# Patient Record
Sex: MALE | Race: WHITE | Employment: OTHER | URBAN - METROPOLITAN AREA
[De-identification: names, ages, dates, MRNs, and addresses within clinical notes are randomized per-mention and may not be internally consistent; named-entity substitution may affect disease eponyms.]

---

## 2017-10-18 ENCOUNTER — HOSPITAL ENCOUNTER (OUTPATIENT)
Dept: GENERAL RADIOLOGY | Age: 67
Discharge: HOME | End: 2017-10-18
Payer: MEDICARE

## 2017-10-18 DIAGNOSIS — R10.9 STOMACH ACHE: ICD-10-CM

## 2017-10-18 PROCEDURE — 74000 XR ABD (KUB): CPT

## 2017-12-01 ENCOUNTER — HOSPITAL ENCOUNTER (OUTPATIENT)
Dept: CT IMAGING | Age: 67
Discharge: HOME | End: 2017-12-01
Attending: ORTHOPAEDIC SURGERY
Payer: MEDICARE

## 2017-12-01 ENCOUNTER — HOSPITAL ENCOUNTER (OUTPATIENT)
Dept: MRI IMAGING | Age: 67
Discharge: HOME | End: 2017-12-01
Attending: ORTHOPAEDIC SURGERY
Payer: MEDICARE

## 2017-12-01 DIAGNOSIS — M54.50 LOW BACK PAIN: ICD-10-CM

## 2017-12-01 DIAGNOSIS — M48.061 LUMBAR SPINAL STENOSIS: ICD-10-CM

## 2017-12-01 DIAGNOSIS — Z98.1 S/P LUMBAR SPINAL FUSION: ICD-10-CM

## 2017-12-01 DIAGNOSIS — M51.36 DDD (DEGENERATIVE DISC DISEASE), LUMBAR: ICD-10-CM

## 2017-12-01 DIAGNOSIS — M54.30 SCIATICA: ICD-10-CM

## 2017-12-01 PROCEDURE — 72131 CT LUMBAR SPINE W/O DYE: CPT

## 2017-12-01 PROCEDURE — 72148 MRI LUMBAR SPINE W/O DYE: CPT

## 2017-12-19 ENCOUNTER — HOSPITAL ENCOUNTER (OUTPATIENT)
Dept: INTERVENTIONAL RADIOLOGY/VASCULAR | Age: 67
Discharge: HOME | End: 2017-12-19
Attending: ORTHOPAEDIC SURGERY | Admitting: RADIOLOGY
Payer: MEDICARE

## 2017-12-19 VITALS
TEMPERATURE: 98.1 F | WEIGHT: 222 LBS | SYSTOLIC BLOOD PRESSURE: 141 MMHG | DIASTOLIC BLOOD PRESSURE: 85 MMHG | RESPIRATION RATE: 20 BRPM | HEART RATE: 94 BPM | BODY MASS INDEX: 32.88 KG/M2 | OXYGEN SATURATION: 98 % | HEIGHT: 69 IN

## 2017-12-19 DIAGNOSIS — M48.061 LUMBAR SPINAL STENOSIS: ICD-10-CM

## 2017-12-19 PROCEDURE — 64483 NJX AA&/STRD TFRM EPI L/S 1: CPT

## 2017-12-19 PROCEDURE — 74011250636 HC RX REV CODE- 250/636: Performed by: RADIOLOGY

## 2017-12-19 PROCEDURE — 74011636320 HC RX REV CODE- 636/320: Performed by: RADIOLOGY

## 2017-12-19 PROCEDURE — 74011000250 HC RX REV CODE- 250: Performed by: RADIOLOGY

## 2017-12-19 RX ORDER — LIDOCAINE HYDROCHLORIDE 10 MG/ML
10 INJECTION, SOLUTION EPIDURAL; INFILTRATION; INTRACAUDAL; PERINEURAL
Status: COMPLETED | OUTPATIENT
Start: 2017-12-19 | End: 2017-12-19

## 2017-12-19 RX ORDER — SODIUM BICARBONATE 42 MG/ML
5 INJECTION, SOLUTION INTRAVENOUS
Status: COMPLETED | OUTPATIENT
Start: 2017-12-19 | End: 2017-12-19

## 2017-12-19 RX ORDER — METHYLPREDNISOLONE ACETATE 40 MG/ML
80 INJECTION, SUSPENSION INTRA-ARTICULAR; INTRALESIONAL; INTRAMUSCULAR; SOFT TISSUE
Status: COMPLETED | OUTPATIENT
Start: 2017-12-19 | End: 2017-12-19

## 2017-12-19 RX ORDER — CYCLOBENZAPRINE HCL 5 MG
5 TABLET ORAL 2 TIMES DAILY
COMMUNITY

## 2017-12-19 RX ADMIN — LIDOCAINE HYDROCHLORIDE 10 ML: 10 INJECTION, SOLUTION EPIDURAL; INFILTRATION; INTRACAUDAL; PERINEURAL at 08:12

## 2017-12-19 RX ADMIN — IOHEXOL 20 ML: 180 INJECTION INTRAVENOUS at 08:11

## 2017-12-19 RX ADMIN — METHYLPREDNISOLONE ACETATE 80 MG: 40 INJECTION, SUSPENSION INTRA-ARTICULAR; INTRALESIONAL; INTRAMUSCULAR; SOFT TISSUE at 08:12

## 2017-12-19 RX ADMIN — SODIUM BICARBONATE 2 ML: 42 INJECTION, SOLUTION INTRAVENOUS at 08:11

## 2017-12-19 NOTE — PROCEDURES
PROCEDURE:TFESI. INDICATION:LLE pain. ANESTHESIA:local.  COMPLICATION:NONE. SPECIMENS REMOVED:none. BLOOD LOSS:NONE. /ASSISTANT:TRIXIE Sr RECOMMENDATIONS:none. CONSENT OBTAINED:YES.  NOTES:none.

## 2017-12-19 NOTE — ROUTINE PROCESS
Discharged via wheel chair to wife. Post procedure discomfort has subsided. Accepted ice chips. Able to stand and step without difficulty. No complaints voiced.

## 2017-12-19 NOTE — IP AVS SNAPSHOT
[address] [facility] [place] [address] 
[phone] Patient: Benja Emerson MRN: [id number] WOM:[birth year] About your hospitalization You were admitted on:  December 19, 2017 You last received care in the:  [facility] RAD ANGIO IR You were discharged on:  December 19, 2017 Why you were hospitalized Your primary diagnosis was:  Not on File Discharge Orders None A check juana indicates which time of day the medication should be taken. My Medications ASK your physician about these medications Instructions Each Dose to Equal  
 Morning Noon Evening Bedtime  
 aspirin 81 mg chewable tablet Your last dose was: Your next dose is: Take 243 mg by mouth daily. 243 mg  
    
   
   
   
  
 cyclobenzaprine 5 mg tablet Commonly known as:  FLEXERIL Your last dose was: Your next dose is: Take 5 mg by mouth two (2) times a day. 5 mg  
    
   
   
   
  
 oxyCODONE IR 5 mg immediate release tablet Commonly known as:  Adamant Pollack Your last dose was: Your next dose is: Take 1-2 Tabs by mouth every three (3) hours as needed. Max Daily Amount: 80 mg.  
 5-10 mg  
    
   
   
   
  
 pantoprazole 40 mg tablet Commonly known as:  PROTONIX Your last dose was: Your next dose is: Take 40 mg by mouth daily. 40 mg  
    
   
   
   
  
 tamsulosin 0.4 mg capsule Commonly known as:  FLOMAX Your last dose was: Your next dose is: Take 0.4 mg by mouth daily. 0.4 mg Discharge Instructions [address] [facility] Steroid Injection Discharge Instructions General Information:  A steroid injection was performed today, placing a combination of a steroid and an anesthetic (numbing medicine) into the space around the nerves of your spine. This is done to treat back pain. It may take 7-10 days for the injection to reach its full potential.  This procedure can be done at any level of the spinal column, depending on where your pain is. Your doctor will have ordered the appropriate level to be treated prior to your coming in for the procedure. Home Care Instructions: You can resume your regular diet. Do not drink alcohol today. You may notice that you have to use your pain medications less after your injection. Some people do not notice much of a change in their pain after the first injection. If that is the case, it is worth your time to have a second one done. This is why these injections are sometimes ordered in a series of three. Keep the puncture site clean and dry for 24 hours, and then you may remove the dressing. Showering is acceptable after the bandage is removed. Call If: 
 You should call your Physician and/or the Radiology Nurse if you have any bleeding other than a small spot on your bandage. Call if you have any signs of infection, fever, increased pain at the puncture site, confusion, or a headache that worsens when you stand and eases when lying flat. Follow-Up Instructions:  Please see your ordering doctor as he/she has requested. Let your doctor know if you have relief from your pain so they may schedule another injection for you if it is indicated. To Reach Us:Side effects of medications used today have been reviewed. Notify us of nausea, itching, hives, dizziness, or anything else out of the ordinary. Should you experience any of these significant changes, please call [phone] between the hours of 7:30 am and 10 pm or [phone] after hours. After hours, ask the  to page the [address] [place] [address] Technologist, and describe the problem to the technologist.  
 
Rest today. No driving today. Be aware there may be numbness or tingling that may last up to 12 hours after the injection. Patient Signature: 
Date: 12/19/2017 Discharging Nurse: Loly Rowe RN Introducing [facility]! Felisha Adrianawinsome introduces [facility] patient portal. Now you can access parts of your medical record, email your doctor's office, and request medication refills online. 1. In your internet browser, go to [email or web address] 2. Click on the First Time User? Click Here link in the Sign In box. You will see the New Member Sign Up page. 3. Enter your [facility] Access Code exactly as it appears below. You will not need to use this code after youve completed the sign-up process. If you do not sign up before the expiration date, you must request a new code. · [facility] Access Code: [email or web address] Expires: 1/16/2018  9:37 AM 
 
4. Enter the last four digits of your Social Security Number (xxxx) and Date of Birth (mm/dd/yyyy) as indicated and click Submit. You will be taken to the next sign-up page. 5. Create a [facility] ID. This will be your [facility] login ID and cannot be changed, so think of one that is secure and easy to remember. 6. Create a [facility] password. You can change your password at any time. 7. Enter your Password Reset Question and Answer. This can be used at a later time if you forget your password. 8. Enter your e-mail address. You will receive e-mail notification when new information is available in [address] 9. Click Sign Up. You can now view and download portions of your medical record. 10. Click the Download Summary menu link to download a portable copy of your medical information. If you have questions, please visit the Frequently Asked Questions section of the [facility] website. Remember, [facility] is NOT to be used for urgent needs. For medical emergencies, dial 911. Now available from your iPhone and Android! Providers Seen During Your Hospitalization Provider Specialty Primary office phone Carmela Lopez MD Orthopedic Surgery [phone] Your Primary Care Physician (PCP) Primary Care Physician Office Phone Office Fax OTHER, PHYS ** None ** ** None ** You are allergic to the following Allergen Reactions Bee Sting (Sting, Bee) Swelling Recent Documentation Smoking Status Former Smoker Emergency Contacts Name Discharge Info Relation Home Work Mobile Phylicia [place] DISCHARGE CAREGIVER [3] Spouse [3] [phone] Patient Belongings The following personal items are in your possession at time of discharge: 
     Visual Aid: None Please provide this summary of care documentation to your next provider. Signatures-by signing, you are acknowledging that this After Visit Summary has been reviewed with you and you have received a copy. Patient Signature:  ____________________________________________________________ Date:  ____________________________________________________________  
  
Tamica Sanchez Provider Signature:  ____________________________________________________________ Date:  ____________________________________________________________

## 2017-12-19 NOTE — DISCHARGE INSTRUCTIONS
[address] Steroid Injection Discharge Instructions    General Information:   A steroid injection was performed today, placing a combination of a steroid and an anesthetic (numbing medicine) into the space around the nerves of your spine. This is done to treat back pain. It may take 7-10 days for the injection to reach its full potential.  This procedure can be done at any level of the spinal column, depending on where your pain is. Your doctor will have ordered the appropriate level to be treated prior to your coming in for the procedure. Home Care Instructions: You can resume your regular diet. Do not drink alcohol today. You may notice that you have to use your pain medications less after your injection. Some people do not notice much of a change in their pain after the first injection. If that is the case, it is worth your time to have a second one done. This is why these injections are sometimes ordered in a series of three. Keep the puncture site clean and dry for 24 hours, and then you may remove the dressing. Showering is acceptable after the bandage is removed. Call If:   You should call your Physician and/or the Radiology Nurse if you have any bleeding other than a small spot on your bandage. Call if you have any signs of infection, fever, increased pain at the puncture site, confusion, or a headache that worsens when you stand and eases when lying flat. Follow-Up Instructions:  Please see your ordering doctor as he/she has requested. Let your doctor know if you have relief from your pain so they may schedule another injection for you if it is indicated. To Reach Us:Side effects of medications used today have been reviewed. Notify us of nausea, itching, hives, dizziness, or anything else out of the ordinary. Should you experience any of these significant changes, please call [phone] between the hours of 7:30 am and 10 pm or [phone] after hours. After hours, ask the  to page the [address] Technologist, and describe the problem to the technologist.     Rest today. No driving today. Be aware there may be numbness or tingling that may last up to 12 hours after the injection.             Patient Signature:  Date: 12/19/2017  Discharging Nurse: Patricia Pace RN

## 2017-12-19 NOTE — IP AVS SNAPSHOT
Summary of Care Report The Summary of Care report has been created to help improve care coordination. Users with access to [employer] or [address] (Web-based application) may access additional patient information including the Discharge Summary. If you are not currently a [address] user and need more information, please call the number listed below in the [place] [address] section and ask to be connected with Medical Records. Facility Information Name Address Phone [address] [phone] Patient Information Patient Name Sex NIKI Hernandez Old ([id number]) Male 1950 Discharge Information Admitting Provider Service Area Unit  
 Paola Rodriguez MD / [phone] Rama Dixon / [phone] Discharge Provider Discharge Date/Time Discharge Disposition Destination (none) (none) (none) (none) Patient Language Language ENGLISH [13] Non-Hospital Problems as of 2017  Never Reviewed Class Noted - Resolved Last Modified Active Problems Spinal stenosis  2016 - Present 2016 by Leeanna Rosas MD  
  Entered by Leeanna Rosas MD  
  
You are allergic to the following Allergen Reactions Bee Sting (Sting, Bee) Swelling Current Discharge Medication List  
  
ASK your doctor about these medications Dose & Instructions Dispensing Information Comments  
 aspirin 81 mg chewable tablet Dose:  243 mg Take 243 mg by mouth daily. Refills:  0  
   
 cyclobenzaprine 5 mg tablet Commonly known as:  FLEXERIL Dose:  5 mg Take 5 mg by mouth two (2) times a day. Refills:  0  
   
 oxyCODONE IR 5 mg immediate release tablet Commonly known as:  Danne Copper  Dose:  5-10 mg  
 Take 1-2 Tabs by mouth every three (3) hours as needed. Max Daily Amount: 80 mg.  
 Quantity:  70 Tab Refills:  0  
   
 pantoprazole 40 mg tablet Commonly known as:  PROTONIX Dose:  40 mg Take 40 mg by mouth daily. Refills:  0  
   
 tamsulosin 0.4 mg capsule Commonly known as:  FLOMAX Dose:  0.4 mg Take 0.4 mg by mouth daily. Refills:  0 Follow-up Information None Discharge Instructions [address] [facility] Steroid Injection Discharge Instructions General Information: A steroid injection was performed today, placing a combination of a steroid and an anesthetic (numbing medicine) into the space around the nerves of your spine. This is done to treat back pain. It may take 7-10 days for the injection to reach its full potential.  This procedure can be done at any level of the spinal column, depending on where your pain is. Your doctor will have ordered the appropriate level to be treated prior to your coming in for the procedure. Home Care Instructions: You can resume your regular diet. Do not drink alcohol today. You may notice that you have to use your pain medications less after your injection. Some people do not notice much of a change in their pain after the first injection. If that is the case, it is worth your time to have a second one done. This is why these injections are sometimes ordered in a series of three. Keep the puncture site clean and dry for 24 hours, and then you may remove the dressing. Showering is acceptable after the bandage is removed. Call If: 
 You should call your Physician and/or the Radiology Nurse if you have any bleeding other than a small spot on your bandage. Call if you have any signs of infection, fever, increased pain at the puncture site, confusion, or a headache that worsens when you stand and eases when lying flat. Follow-Up Instructions:  Please see your ordering doctor as he/she has requested. Let your doctor know if you have relief from your pain so they may schedule another injection for you if it is indicated. To Reach Us:Side effects of medications used today have been reviewed. Notify us of nausea, itching, hives, dizziness, or anything else out of the ordinary. Should you experience any of these significant changes, please call [phone] between the hours of 7:30 am and 10 pm or [phone] after hours. After hours, ask the  to page the [address] Technologist, and describe the problem to the technologist.  
 
Rest today. No driving today. Be aware there may be numbness or tingling that may last up to 12 hours after the injection. Patient Signature: 
Date: 12/19/2017 Discharging Nurse: Marvel Salazar RN Chart Review Routing History No Routing History on File

## 2017-12-20 NOTE — H&P
Radiology History and Physical    Patient: Kevin Archibald 75 y.o. male     Chief Complaint: No chief complaint on file. History of Present Illness: LBP. History:    Past Medical History:   Diagnosis Date    BPH (benign prostatic hypertrophy)     GERD (gastroesophageal reflux disease)     Kidney stone     Nausea & vomiting      Family History   Problem Relation Age of Onset    Stroke Mother     Hypertension Mother     Post-op Nausea/Vomiting Mother     Cancer Father      LUNG    Cancer Daughter 5     LEUKEMIA     Social History     Social History    Marital status:      Spouse name: N/A    Number of children: N/A    Years of education: N/A     Occupational History    Not on file. Social History Main Topics    Smoking status: Former Smoker     Quit date: 1/1/1970    Smokeless tobacco: Never Used    Alcohol use No    Drug use: No    Sexual activity: Not on file     Other Topics Concern    Not on file     Social History Narrative       Allergies: Allergies   Allergen Reactions    Bee Sting [Sting, Bee] Swelling       Current Medications:  No current facility-administered medications for this encounter. Current Outpatient Prescriptions   Medication Sig    cyclobenzaprine (FLEXERIL) 5 mg tablet Take 5 mg by mouth two (2) times a day.  oxyCODONE IR (ROXICODONE) 5 mg immediate release tablet Take 1-2 Tabs by mouth every three (3) hours as needed. Max Daily Amount: 80 mg.    tamsulosin (FLOMAX) 0.4 mg capsule Take 0.4 mg by mouth daily.  pantoprazole (PROTONIX) 40 mg tablet Take 40 mg by mouth daily.  aspirin 81 mg chewable tablet Take 243 mg by mouth daily. Physical Exam:  Blood pressure 141/85, pulse 94, temperature 98.1 °F (36.7 °C), resp. rate 20, height 5' 8.5\" (1.74 m), weight 100.7 kg (222 lb), SpO2 98 %.   GENERAL: alert, cooperative, mild distress, appears stated age      Alerts:      Laboratory:    No results for input(s): HGB, HCT, WBC, PLT, INR, BUN, CREA, K, CRCLT, HGBEXT, HCTEXT, PLTEXT in the last 72 hours. No lab exists for component: PTT, PT, INREXT      Plan of Care/Planned Procedure:  Risks, benefits, and alternatives reviewed with patient and he agrees to proceed with the procedure.

## 2022-02-23 ENCOUNTER — TRANSCRIBE ORDER (OUTPATIENT)
Dept: SCHEDULING | Age: 72
End: 2022-02-23

## 2022-02-23 DIAGNOSIS — M51.36 LUMBAR DEGENERATIVE DISC DISEASE: Primary | ICD-10-CM

## 2022-02-28 ENCOUNTER — HOSPITAL ENCOUNTER (OUTPATIENT)
Dept: MRI IMAGING | Age: 72
Discharge: HOME | End: 2022-02-28
Payer: MEDICARE

## 2022-02-28 VITALS — BODY MASS INDEX: 31.47 KG/M2 | WEIGHT: 210 LBS

## 2022-02-28 DIAGNOSIS — M51.36 LUMBAR DEGENERATIVE DISC DISEASE: ICD-10-CM

## 2022-02-28 PROCEDURE — 74011250636 HC RX REV CODE- 250/636: Performed by: RADIOLOGY

## 2022-02-28 PROCEDURE — 72158 MRI LUMBAR SPINE W/O & W/DYE: CPT

## 2022-02-28 PROCEDURE — A9575 INJ GADOTERATE MEGLUMI 0.1ML: HCPCS | Performed by: RADIOLOGY

## 2022-02-28 RX ORDER — GADOTERATE MEGLUMINE 376.9 MG/ML
19 INJECTION INTRAVENOUS
Status: COMPLETED | OUTPATIENT
Start: 2022-02-28 | End: 2022-02-28

## 2022-02-28 RX ADMIN — GADOTERATE MEGLUMINE 19 ML: 376.9 INJECTION INTRAVENOUS at 15:12
